# Patient Record
Sex: FEMALE | Race: AMERICAN INDIAN OR ALASKA NATIVE
[De-identification: names, ages, dates, MRNs, and addresses within clinical notes are randomized per-mention and may not be internally consistent; named-entity substitution may affect disease eponyms.]

---

## 2019-04-06 ENCOUNTER — HOSPITAL ENCOUNTER (EMERGENCY)
Dept: HOSPITAL 14 - H.ER | Age: 49
Discharge: HOME | End: 2019-04-06
Payer: COMMERCIAL

## 2019-04-06 VITALS — HEART RATE: 72 BPM | SYSTOLIC BLOOD PRESSURE: 140 MMHG | DIASTOLIC BLOOD PRESSURE: 90 MMHG

## 2019-04-06 VITALS — TEMPERATURE: 98.2 F | RESPIRATION RATE: 18 BRPM | OXYGEN SATURATION: 100 %

## 2019-04-06 DIAGNOSIS — R42: Primary | ICD-10-CM

## 2019-04-06 NOTE — ED PDOC
- ECG


O2 Sat by Pulse Oximetry: 100 (RA)





Medical Decision Making


Medical Decision Makin:00


Patient is signed out to me by Isra Thornton MD pending CT head and 

reevaluation.





19:58


CT head read and reviewed by radiologist


FINDINGS: 





BRAIN 


No acute intraparenchymal hemorrhage. No mass lesion. No CT evidence for acute 

territorial infarct. No midline shift or extra-axial collections. 





VENTRICLES: 


No hydrocephalus. 





ORBITS: 


The orbits are unremarkable. 





SINUSES AND MASTOIDS: 


The paranasal sinuses and mastoid air cells are clear. 





BONES: 


No fracture. 





SOFT TISSUES: 


Unremarkable. 





IMPRESSION: 





No acute intracranial abnormality.





21:10


Upon provider reevaluation patient reports having an improvement in symptoms, is

medically stable, and requires no further treatment in the ED at this time. 

Patient will be discharged home with Rx for antivert. Counseling was provided 

and all questions were answered regarding diagnosis of vertigo and head injury 

and need for follow up with PMD. There is agreement to discharge plan. Return if

symptoms persist or worsen.





 

--------------------------------------------------------------------------------


-----------------


ScribeAttestation:


Documented byEstrella Stevenson, acting as a scribe for James Swann MD.





Provider ScribeAttestation:


All medical record entries made by the Scribe were at my direction and 

personally dictated by me. I have reviewed the chart and agree that the record 

accurately reflects my personal performance of the history, physical exam, 

medical decision making, and the department course for this patient. I have also

personally directed, reviewed, and agree with the discharge instructions and 

disposition.





Disposition





- Clinical Impression


Clinical Impression: 


 Dizziness, Head injury








- POA


Present On Arrival: None





- Disposition


Disposition: Routine/Home


Disposition Time: 21:10


Condition: STABLE


Prescriptions: 


Meclizine [Antivert] 25 mg PO Q6 PRN #12 tab


 PRN Reason: Dizziness


Instructions:  Vertigo (a Type of Dizziness), Labyrinthitis

## 2019-04-06 NOTE — ED PDOC
HPI: General Adult


Time Seen by Provider: 19 17:47


Chief Complaint (Nursing): Dizziness/Lightheaded


Chief Complaint (Provider): Dizziness


History Per: Patient


History/Exam Limitations: no limitations


Onset/Duration Of Symptoms: Days (2x)


Current Symptoms Are (Timing): Still Present


Severity: Moderate


Additional Complaint(s): 





48 year old female with no pertinent past medical history presents to the ED for

an evaluation of dizziness that started yesterday. Patient states that yesterday

she was taking the recycling out, was coming back up a set of stairs, and 

miscalculated where to step, and fell onto her right knee, left hand, and hit 

the right side of her face against the wall. Patient reports icing her knee and 

going to work shortly after. Last night after falling asleep for an hour, 

patient reports waking up with severe dizziness (room spinning). Patient reports

having nausea and dizziness today, and states that laying down exacerbates her 

symptoms. Patient denies having headaches, vomiting, loss of  consciousness, 

left hand pain, or difficulty walking. 





PMD: None provided





Past Medical History


Reviewed: Historical Data, Nursing Documentation, Vital Signs


Vital Signs: 





                                Last Vital Signs











Temp  98.2 F   19 17:32


 


Pulse  76   19 17:32


 


Resp  18   19 17:32


 


BP  145/90   19 17:32


 


Pulse Ox  100   19 17:32











[facility] Report Viewed: Yes





- Medical History


PMH: No Chronic Diseases





- Surgical History


Other surgeries: myomectomy





- Family History


Family History: States: No Known Family Hx





- Social History


Current smoker - smoking cessation education provided: No


Alcohol: Social


Drugs: Denies





- Home Medications


Home Medications: 


                                Ambulatory Orders











 Medication  Instructions  Recorded


 


Meclizine [Antivert] 25 mg PO Q6 PRN #12 tab 19














- Allergies


Allergies/Adverse Reactions: 


                                    Allergies











Allergy/AdvReac Type Severity Reaction Status Date / Time


 


No Known Allergies Allergy   Verified 19 17:32














Review of Systems


ROS Statement: Except As Marked, All Systems Reviewed And Found Negative


Gastrointestinal: Positive for: Nausea.  Negative for: Vomiting


Musculoskeletal: Positive for: Other (right knee pain. (-) difficulty walking). 

 Negative for: Arm Pain (left arm or hand pain)


Neurological: Positive for: Dizziness.  Negative for: Headache





Physical Exam





- Reviewed


Nursing Documentation Reviewed: Yes


Vital Signs Reviewed: Yes





- Physical Exam


Appears: Positive for: Well, Non-toxic, No Acute Distress


Head Exam: Positive for: ATRAUMATIC, NORMOCEPHALIC


Skin: Positive for: Normal Color, Warm, Dry


Eye Exam: Positive for: Normal appearance, EOMI, PERRL


Cardiovascular/Chest: Positive for: Regular Rate, Rhythm


Respiratory: Positive for: Normal Breath Sounds


Extremity: Positive for: Normal ROM, Other (left forearm volar aspect: small 

ecchymosis. Right knee: abrasions).  Negative for: Tenderness, Deformity, 

Swelling


Neurological/Psych: Positive for: Awake, Alert, Oriented (3x).  Negative for: 

Motor/Sensory Deficits





- ECG


O2 Sat by Pulse Oximetry: 100 (RA)


Pulse Ox Interpretation: Normal





Medical Decision Making


Medical Decision Makin:47


Initial impression: 48 year old female with a head injury. Rule out intracranial

 bleed. Differential diagnoses include but are not limited to benign proximal 

positional vertigo.


Initial plan:


* CT head w/o contrast


* EKG


* meclizine 25 mg PO once





19:00


Patient is signed out by me to James Swann MD pending CT head and 

reevaluation.





 

--------------------------------------------------------------------------------


-----------------


ScribeAttestation:


Documented byEstrella Stevenson, acting as a scribe for Isra Thornton MD.





Provider ScribeAttestation:


All medical record entries made by the Scribe were at my direction and 

personally dictated by me. I have reviewed the chart and agree that the record 

accurately reflects my personal performance of the history, physical exam, 

medical decision making, and the department course for this patient. I have also

 personally directed, reviewed, and agree with the discharge instructions and 

disposition.








Disposition





- Clinical Impression


Clinical Impression: 


 Dizziness, Head injury








- Patient ED Disposition


Is Patient to be Admitted: Transfer of Care


Counseled Patient/Family Regarding: Studies Performed, Diagnosis





- Disposition


Disposition: Transfer of Care


Disposition Time: 19:00


Condition: STABLE


Prescriptions: 


Meclizine [Antivert] 25 mg PO Q6 PRN #12 tab


 PRN Reason: Dizziness


Instructions:  Vertigo (a Type of Dizziness), Labyrinthitis


Patient Signed Over To: James Swann

## 2019-04-07 NOTE — CT
Date of service: 



04/06/2019



PROCEDURE:  CT HEAD WITHOUT CONTRAST.



HISTORY:

head injury dizziness



COMPARISON:

None available.



TECHNIQUE:

Axial computed tomography images were obtained through the head/brain 

without intravenous contrast.  



Radiation dose:



Total exam DLP = 881.26 mGy-cm.



This CT exam was performed using one or more of the following dose 

reduction techniques: Automated exposure control, adjustment of the 

mA and/or kV according to patient size, and/or use of iterative 

reconstruction technique.



FINDINGS:



HEMORRHAGE:

No intracranial hemorrhage. 



BRAIN:

Normal gray-white matter differentiation and density are appreciated 

throughout the cerebrum and cerebellum with the brainstem appearing 

unremarkable as well.  There is no mass effect.  There is no 

suspicious extra-axial fluid collection and the midline brain anatomy 

appears diffusely unremarkable. 



VENTRICLES:

Unremarkable. No hydrocephalus. 



CALVARIUM:

No destructive bony lesion or displaced fracture identified including 

through the skullbase.



PARANASAL SINUSES:

Unremarkable as visualized. No significant inflammatory changes.



MASTOID AIR CELLS:

Unremarkable as visualized. No inflammatory changes.



OTHER FINDINGS:

None.



IMPRESSION:

Unremarkable unenhanced head CT.  



Preliminary report provided by Tisha, 04/06/2019, 7:58 p.m..

## 2019-04-07 NOTE — CARD
--------------- APPROVED REPORT --------------





Date of service: 04/06/2019



EKG Measurement

Heart [id number]

KS [id number]

[id number]

[id number]

[id number]



<Conclusion>

Normal sinus rhythm

Possible Left atrial enlargement

Borderline ECG